# Patient Record
Sex: MALE | Race: WHITE | URBAN - METROPOLITAN AREA
[De-identification: names, ages, dates, MRNs, and addresses within clinical notes are randomized per-mention and may not be internally consistent; named-entity substitution may affect disease eponyms.]

---

## 2017-03-20 ENCOUNTER — OFFICE VISIT (OUTPATIENT)
Dept: FAMILY MEDICINE CLINIC | Facility: CLINIC | Age: 57
End: 2017-03-20

## 2017-03-20 ENCOUNTER — TELEPHONE (OUTPATIENT)
Dept: FAMILY MEDICINE CLINIC | Facility: CLINIC | Age: 57
End: 2017-03-20

## 2017-03-20 VITALS
WEIGHT: 240 LBS | BODY MASS INDEX: 30.8 KG/M2 | DIASTOLIC BLOOD PRESSURE: 73 MMHG | HEIGHT: 74 IN | SYSTOLIC BLOOD PRESSURE: 104 MMHG
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
## 2017-03-20 DIAGNOSIS — S82.92XD: ICD-10-CM

## 2017-03-20 DIAGNOSIS — S22.009A CLOSED FRACTURE OF THORACIC VERTEBRA, UNSPECIFIED FRACTURE MORPHOLOGY, UNSPECIFIED THORACIC VERTEBRAL LEVEL, INITIAL ENCOUNTER (HCC): ICD-10-CM

## 2017-03-20 PROCEDURE — 99212 OFFICE O/P EST SF 10 MIN: CPT | Performed by: FAMILY MEDICINE

## 2017-03-20 PROCEDURE — 99202 OFFICE O/P NEW SF 15 MIN: CPT | Performed by: FAMILY MEDICINE
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
## 2017-03-25 NOTE — TELEPHONE ENCOUNTER
Can see one of the other orthopedic MD's like Dr Kassy Cleaning or Jermaine Harvey if cannot get in to see Dr Sha Wilson. He was in [address] and seen at [facility], If having any sig symptoms, to go to ER.

## 2017-03-29 NOTE — TELEPHONE ENCOUNTER
Call placed to pt. Message left informing pt of NP note. Left message to call back with any further concerns.

## 2017-03-30 ENCOUNTER — TELEPHONE (OUTPATIENT)
Dept: FAMILY MEDICINE CLINIC | Facility: CLINIC | Age: 57
End: 2017-03-30

## 2017-03-30 DIAGNOSIS — S22.009S CLOSED FRACTURE OF THORACIC VERTEBRA, UNSPECIFIED FRACTURE MORPHOLOGY, UNSPECIFIED THORACIC VERTEBRAL LEVEL, SEQUELA: Primary | ICD-10-CM

## 2017-03-30 NOTE — TELEPHONE ENCOUNTER
NEEDS REFERRAL TO SPINE DR AT [facility] FOR F/U VISIT  , DR. Louisa Martins , PHONE # [phone]
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
## 2017-04-01 NOTE — TELEPHONE ENCOUNTER
Referral request noted and signed. Referral approved, generated and sent to [facility].

## 2017-04-07 ENCOUNTER — OFFICE VISIT (OUTPATIENT)
Dept: ORTHOPEDICS CLINIC | Facility: CLINIC | Age: 57
End: 2017-04-07

## 2017-04-07 ENCOUNTER — HOSPITAL ENCOUNTER (OUTPATIENT)
Dept: GENERAL RADIOLOGY | Facility: HOSPITAL | Age: 57
Discharge: HOME | End: 2017-04-07
Attending: ORTHOPAEDIC SURGERY
Payer: COMMERCIAL

## 2017-04-07 ENCOUNTER — HOSPITAL ENCOUNTER (OUTPATIENT)
Dept: ULTRASOUND IMAGING | Facility: HOSPITAL | Age: 57
Discharge: HOME | End: 2017-04-07
Attending: ORTHOPAEDIC SURGERY
Payer: COMMERCIAL

## 2017-04-07 DIAGNOSIS — R60.9 SWELLING: ICD-10-CM

## 2017-04-07 DIAGNOSIS — S82.422A CLOSED DISPLACED TRANSVERSE FRACTURE OF SHAFT OF LEFT FIBULA, INITIAL ENCOUNTER: Primary | ICD-10-CM

## 2017-04-07 DIAGNOSIS — T14.8XXA FRACTURE: ICD-10-CM

## 2017-04-07 PROCEDURE — 99243 OFF/OP CNSLTJ NEW/EST LOW 30: CPT | Performed by: ORTHOPAEDIC SURGERY

## 2017-04-07 PROCEDURE — 93971 EXTREMITY STUDY: CPT

## 2017-04-07 PROCEDURE — 99212 OFFICE O/P EST SF 10 MIN: CPT | Performed by: ORTHOPAEDIC SURGERY

## 2017-04-07 PROCEDURE — 73560 X-RAY EXAM OF KNEE 1 OR 2: CPT

## 2017-04-07 PROCEDURE — 73565 X-RAY EXAM OF KNEES: CPT

## 2017-04-07 PROCEDURE — 27780 TREATMENT OF FIBULA FRACTURE: CPT | Performed by: ORTHOPAEDIC SURGERY
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
## 2017-04-10 ENCOUNTER — TELEPHONE (OUTPATIENT)
Dept: FAMILY MEDICINE CLINIC | Facility: CLINIC | Age: 57
End: 2017-04-10

## 2017-04-10 DIAGNOSIS — S22.009D CLOSED FRACTURE OF THORACIC VERTEBRA WITH ROUTINE HEALING, UNSPECIFIED FRACTURE MORPHOLOGY, UNSPECIFIED THORACIC VERTEBRAL LEVEL, SUBSEQUENT ENCOUNTER: Primary | ICD-10-CM

## 2017-04-10 NOTE — TELEPHONE ENCOUNTER
Patient said that he needs a referral to a spine specialist.   Was seen at [facility] and he has the [employer]. Fracture of spine in the 8T bone. Call patient with name of doctor when referral done so he can make apt.

## 2017-04-11 NOTE — TELEPHONE ENCOUNTER
Referral request noted. Referral approved for Dr Marcelle Temple, generated and sent to [facility]. Can provide pt with information.

## 2017-04-12 ENCOUNTER — TELEPHONE (OUTPATIENT)
Dept: ORTHOPEDICS CLINIC | Facility: CLINIC | Age: 57
End: 2017-04-12

## 2017-04-12 NOTE — TELEPHONE ENCOUNTER
pt called. Needs a note to excuse him from work until May 8th. pts next follow up with [facility] is on 5/5/17. Please fax [phone] Millicent Ghotra.   Thank you

## 2017-04-12 NOTE — TELEPHONE ENCOUNTER
Pt. Called back to requesting for excuse note to be also faxed to his 2nd job - Attn: Alen Robertson - fax # [phone].

## 2017-04-13 NOTE — TELEPHONE ENCOUNTER
Spoke to pt and informed him of [facility] response. Verified faxes with pt and informed that note will be sent as requested with off work until 05/05/17. Letter generated and faxed as requested.

## 2017-04-20 NOTE — TELEPHONE ENCOUNTER
LM on pt's preferred # informing that letter was faxed to [phone] and confirmation of fax was received.

## 2017-04-21 ENCOUNTER — TELEPHONE (OUTPATIENT)
Dept: ORTHOPEDICS CLINIC | Facility: CLINIC | Age: 57
End: 2017-04-21

## 2017-04-21 NOTE — TELEPHONE ENCOUNTER
pts work is faxing over paperwork that needs to be filled out for pts absence - she states she has faxed it over before but has just received a Drs note instead of the paperwork completed

## 2017-04-28 ENCOUNTER — TELEPHONE (OUTPATIENT)
Dept: FAMILY MEDICINE CLINIC | Facility: CLINIC | Age: 57
End: 2017-04-28

## 2017-04-28 NOTE — TELEPHONE ENCOUNTER
Pt calling to get an earlier appt with a spine specialist, Dr. Juanita Russell has May 17 but pt needs to get back to work.  Please advise

## 2017-05-02 ENCOUNTER — TELEPHONE (OUTPATIENT)
Dept: ORTHOPEDICS CLINIC | Facility: CLINIC | Age: 57
End: 2017-05-02

## 2017-05-02 NOTE — TELEPHONE ENCOUNTER
pt called. He has an appt on 5/17/17 with DP. Patient is ready to get back to work. He asked if you could please release him or perhaps ANDREI could at his appt this Friday 5/5/17. Please advise.

## 2017-05-02 NOTE — TELEPHONE ENCOUNTER
Message noted. Can call orthopedics to see if he can be seen sooner or see one of the other orthopedics MD's.

## 2017-05-02 NOTE — TELEPHONE ENCOUNTER
S/w pt and advised him he cannot be cleared for work w/o being eval. Offered appt w/ DP on 5/3/17 @ 10am. Advised him there will be a wait.

## 2017-05-03 ENCOUNTER — OFFICE VISIT (OUTPATIENT)
Dept: ORTHOPEDICS CLINIC | Facility: CLINIC | Age: 57
End: 2017-05-03

## 2017-05-03 ENCOUNTER — HOSPITAL ENCOUNTER (OUTPATIENT)
Dept: GENERAL RADIOLOGY | Facility: HOSPITAL | Age: 57
Discharge: HOME | End: 2017-05-03
Attending: ORTHOPAEDIC SURGERY
Payer: COMMERCIAL

## 2017-05-03 DIAGNOSIS — S32.000A CLOSED WEDGE FRACTURE OF LUMBAR VERTEBRA, UNSPECIFIED LUMBAR VERTEBRAL LEVEL, INITIAL ENCOUNTER (HCC): Primary | ICD-10-CM

## 2017-05-03 DIAGNOSIS — S32.000A CLOSED WEDGE FRACTURE OF LUMBAR VERTEBRA, UNSPECIFIED LUMBAR VERTEBRAL LEVEL, INITIAL ENCOUNTER (HCC): ICD-10-CM

## 2017-05-03 PROCEDURE — 72070 X-RAY EXAM THORAC SPINE 2VWS: CPT

## 2017-05-03 PROCEDURE — 99212 OFFICE O/P EST SF 10 MIN: CPT | Performed by: ORTHOPAEDIC SURGERY

## 2017-05-03 PROCEDURE — 99213 OFFICE O/P EST LOW 20 MIN: CPT | Performed by: ORTHOPAEDIC SURGERY

## 2017-05-03 RX ORDER — ACETAMINOPHEN 325 MG/1
325 TABLET ORAL EVERY 6 HOURS PRN
COMMUNITY

## 2017-05-03 RX ORDER — FERROUS SULFATE 325(65) MG
325 TABLET ORAL
COMMUNITY

## 2017-05-03 NOTE — PROGRESS NOTES
[address], [address] [facility]    Patient: [id number] [address] Record Number: [id number]    Referring Physician:  No ref. provider found        Andria Song follows up with me today.       HIS Jacey signs (tenderness, simulation, distraction, regional weakness, overreaction) are noted to be negative. Examination of the peripheral vascular system demonstrated no abnormalities by observation and palpation.

## 2017-05-05 ENCOUNTER — OFFICE VISIT (OUTPATIENT)
Dept: ORTHOPEDICS CLINIC | Facility: CLINIC | Age: 57
End: 2017-05-05

## 2017-05-05 ENCOUNTER — HOSPITAL ENCOUNTER (OUTPATIENT)
Dept: GENERAL RADIOLOGY | Facility: HOSPITAL | Age: 57
Discharge: HOME | End: 2017-05-05
Attending: ORTHOPAEDIC SURGERY
Payer: COMMERCIAL

## 2017-05-05 DIAGNOSIS — S82.422A CLOSED DISPLACED TRANSVERSE FRACTURE OF SHAFT OF LEFT FIBULA, INITIAL ENCOUNTER: Primary | ICD-10-CM

## 2017-05-05 DIAGNOSIS — Z47.89 ORTHOPEDIC AFTERCARE: ICD-10-CM

## 2017-05-05 PROCEDURE — 99212 OFFICE O/P EST SF 10 MIN: CPT | Performed by: ORTHOPAEDIC SURGERY

## 2017-05-05 PROCEDURE — 73560 X-RAY EXAM OF KNEE 1 OR 2: CPT | Performed by: ORTHOPAEDIC SURGERY

## 2017-05-05 PROCEDURE — 99024 POSTOP FOLLOW-UP VISIT: CPT | Performed by: ORTHOPAEDIC SURGERY

## 2017-05-05 NOTE — PROGRESS NOTES
This is a pleasant 17-year-old male with a previous diagnosis of a left transverse proximal  fibula shaft fracture. The patient is 2 months out from this injury. The patient has been weightbearing as tolerated on the left lower extremity.   The patient ha

## 2017-06-02 ENCOUNTER — HOSPITAL ENCOUNTER (OUTPATIENT)
Dept: GENERAL RADIOLOGY | Facility: HOSPITAL | Age: 57
Discharge: HOME | End: 2017-06-02
Attending: ORTHOPAEDIC SURGERY
Payer: COMMERCIAL

## 2017-06-02 ENCOUNTER — OFFICE VISIT (OUTPATIENT)
Dept: ORTHOPEDICS CLINIC | Facility: CLINIC | Age: 57
End: 2017-06-02

## 2017-06-02 DIAGNOSIS — S82.422A CLOSED DISPLACED TRANSVERSE FRACTURE OF SHAFT OF LEFT FIBULA, INITIAL ENCOUNTER: Primary | ICD-10-CM

## 2017-06-02 DIAGNOSIS — Z47.89 ORTHOPEDIC AFTERCARE: ICD-10-CM

## 2017-06-02 PROCEDURE — 73560 X-RAY EXAM OF KNEE 1 OR 2: CPT | Performed by: ORTHOPAEDIC SURGERY

## 2017-06-02 PROCEDURE — 99024 POSTOP FOLLOW-UP VISIT: CPT | Performed by: ORTHOPAEDIC SURGERY

## 2017-06-02 PROCEDURE — 99212 OFFICE O/P EST SF 10 MIN: CPT | Performed by: ORTHOPAEDIC SURGERY
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
## (undated) NOTE — MR AVS SNAPSHOT
[place]  [address]  [phone]               Thank you for choosing us for your health care visit with Edgardo Sabillon MD.  We are glad to serve you and happy to provide you with this summ your Zip Code and Date of Birth to complete the sign-up process. If you do not sign up before the expiration date, you must request a new code.     Your unique [place] Access Code: [email or web address]  Expires: 5/19/2017  2:06 PM    If you have questions, you can c
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
## (undated) NOTE — Clinical Note
5/3/2017          To Whom It May Concern:    Lizzette Ly is currently under my medical care and may  return to work at this time. Full time  Full duty  If you require additional information please contact our office.         Sincerely,    Radha Santos

## (undated) NOTE — MR AVS SNAPSHOT
[place]  [address]  [phone]               Thank you for choosing us for your health care visit with Jb Lester MD.  We are glad to serve you and happy to provide you with this yates [address]  [address]  [address] [place] [address]    [address]  [address] [place]    It is the patient's responsibility to check with and follow their insurance Instructions and Information about Your Health     None      Allergies as of Apr 07, 2017     No Known Allergies                   Current Medications      Notice  As of 4/7/2017  3:54 PM    You have not been prescribed any medications.             MyChart

## (undated) NOTE — MR AVS SNAPSHOT
[facility] [address] [address] [address] [phone]               Thank you for choosing us for your health care visit with Home Marinelli MD.  We are glad to serve you and happy to provide you with this summary of y appointment. Failure to obtain required authorization numbers can create reimbursement difficulties for you. Assoc Dx: Leg fracture, left, closed, with routine healing, subsequent encounter [S84. 24XD]          Reason for [facility] F/U Make half your plate fruits and vegetables Highly refined, white starches including white bread, rice and pasta   Eat plenty of protein, keep the fat content low Sugars:  sodas and sports drinks, candies and desserts   Eat plenty of low-fat dairy products

## (undated) NOTE — MR AVS SNAPSHOT
[place]  [address]  [phone]               Thank you for choosing us for your health care visit with Jon See MD.  We are glad to serve you and happy to provide you with this yates Closed displaced transverse fracture of shaft of left fibula, initial encounter    -  Primary    Orthopedic aftercare          Instructions and Information about Your Health     None      Allergies as of Jun 02, 2017     No Known Allergies

## (undated) NOTE — Clinical Note
5/5/2017          To Whom It May Concern:    Chris Lawton is currently under my medical care and may return to work on Monday 5/8/2017 with no restrictions. If you require additional information please contact our office. Sincerely,    Stephanie Goldstein.  NOHEMY

## (undated) NOTE — MR AVS SNAPSHOT
[place]  [address]  [phone]               Thank you for choosing us for your health care visit with Griffin Nicole MD.  We are glad to serve you and happy to provide you with this yates It is the patient's responsibility to check with and follow their insurance company's guidelines for prior authorization for this test.  You may be held responsible for payment in full if proper authorization is not acquired.   Please contact the Patient [facility] online at  [email or web address]